# Patient Record
Sex: FEMALE
[De-identification: names, ages, dates, MRNs, and addresses within clinical notes are randomized per-mention and may not be internally consistent; named-entity substitution may affect disease eponyms.]

---

## 2021-12-28 ENCOUNTER — HOSPITAL ENCOUNTER (EMERGENCY)
Dept: HOSPITAL 5 - ED | Age: 31
Discharge: HOME | End: 2021-12-28
Payer: SELF-PAY

## 2021-12-28 VITALS — SYSTOLIC BLOOD PRESSURE: 140 MMHG | DIASTOLIC BLOOD PRESSURE: 99 MMHG

## 2021-12-28 DIAGNOSIS — R03.0: ICD-10-CM

## 2021-12-28 DIAGNOSIS — J02.8: Primary | ICD-10-CM

## 2021-12-28 PROCEDURE — 99283 EMERGENCY DEPT VISIT LOW MDM: CPT

## 2021-12-28 PROCEDURE — 87430 STREP A AG IA: CPT

## 2021-12-28 PROCEDURE — 87116 MYCOBACTERIA CULTURE: CPT

## 2021-12-28 NOTE — EMERGENCY DEPARTMENT REPORT
ED General Adult HPI





- General


Chief complaint: Sore Throat


Stated complaint: FLU TEST


Time Seen by Provider: 12/28/21 12:15


Source: patient


Mode of arrival: Ambulatory


Limitations: No Limitations





- History of Present Illness


Initial comments: 





1-year-old -American female patient presents with complaints of sore 

throat and body aches starting this morning. She reports her girlfriend is sick 

with similar symptoms. She denies any loss of taste or smell, cough, shortness 

of breath, rashes, or fever. Patient is not vaccinated against COVID-19. She 

denies any other past medical history. NKDA per patient


Severity scale (0 -10): 10





- Related Data


                                  Previous Rx's











 Medication  Instructions  Recorded  Last Taken  Type


 


Ibuprofen [Motrin 800 MG tab] 800 mg PO Q8HR PRN #20 tablet 12/28/21 Unknown Rx


 


Loratadine 10 mg PO QDAY #10 tablet 12/28/21 Unknown Rx











                                    Allergies











Allergy/AdvReac Type Severity Reaction Status Date / Time


 


nut - unspecified Allergy Severe Swelling Verified 12/28/21 10:06














ED Review of Systems


ROS: 


Stated complaint: FLU TEST


Other details as noted in HPI





Constitutional: malaise.  denies: chills, diaphoresis, fever, weakness


ENT: throat pain


Respiratory: denies: cough, shortness of breath


Cardiovascular: denies: chest pain


Neurological: denies: headache


Hematological/Lymphatic: denies: swollen glands





ED Past Medical Hx





- Medications


Home Medications: 


                                Home Medications











 Medication  Instructions  Recorded  Confirmed  Last Taken  Type


 


Ibuprofen [Motrin 800 MG tab] 800 mg PO Q8HR PRN #20 tablet 12/28/21  Unknown Rx


 


Loratadine 10 mg PO QDAY #10 tablet 12/28/21  Unknown Rx














ED Physical Exam





- General


Limitations: No Limitations


General appearance: alert, in no apparent distress





- Head


Head exam: Present: atraumatic, normocephalic





- Eye


Eye exam: Present: normal appearance.  Absent: scleral icterus





- Expanded ENT Exam


  ** Expanded


Mouth exam: Absent: drooling, trismus, muffled voice


Throat exam: Positive: tonsillar erythema (Mild bilateral).  Negative: 

tonsillomegaly, tonsillar exudate, R peritonsillar mass, L peritonsillar mass





- Neck


Neck exam: Present: normal inspection, full ROM.  Absent: tenderness, 

lymphadenopathy





- Respiratory


Respiratory exam: Present: normal lung sounds bilaterally.  Absent: respiratory 

distress





- Cardiovascular


Cardiovascular Exam: Present: regular rate





- Neurological Exam


Neurological exam: Present: alert, oriented X3





- Psychiatric


Psychiatric exam: Present: normal affect, normal mood





- Skin


Skin exam: Present: warm, dry, intact, normal color.  Absent: rash





ED Course


                                   Vital Signs











  12/28/21





  10:05


 


Temperature 97.9 F


 


Pulse Rate 65


 


Respiratory 16





Rate 


 


Blood Pressure 140/99


 


O2 Sat by Pulse 100





Oximetry 














ED Medical Decision Making





- Medical Decision Making








1-year-old -American female patient presents with complaints of sore 

throat and body aches starting this morning. She reports her girlfriend is sick 

with similar symptoms. She denies any loss of taste or smell, cough, shortness 

of breath, rashes, or fever. Patient is not vaccinated against COVID-19. She 

denies any other past medical history. NKDA per patient





Mild tonsillar erythema noted on exam without exudates or swelling. Rapid strep 

is negative. Will treat for viral pharyngitis conservatively. Recommend patient 

gets outpatient COVID-19 testing within the next 24 to 48 hours and self 

quarantine's until further instructed. She is otherwise well-appearing, her 

vitals are stable, she is stable for discharge home. Strict return precautions 

were discussed in detail with patient who verbalizes understanding. Patient also

recommended to PCP for recheck of her blood pressure


Critical care attestation.: 


If time is entered above; I have spent that time in minutes in the direct care 

of this critically ill patient, excluding procedure time.








ED Disposition


Clinical Impression: 


 Viral pharyngitis, Elevated blood pressure reading without diagnosis of 

hypertension





Disposition: 01 HOME / SELF CARE / HOMELESS


Is pt being admited?: No


Condition: Stable


Instructions:  Pharyngitis, Easy-to-Read, Hypertension, Adult


Additional Instructions: 


You test for strep throat is negative


Please ensure you get tested for Covid 19 within the next 24 to 48 hours and 

self quarantine until your results are back


The prescribed medications will help treat your symptoms of sore throat and body

aches


Please ensure you are drinking plenty of water and taking vitamin C to help 

boost your immune system


Your blood pressure was noted to be mildly elevated today, please follow-up with

your primary care doctor within 3 to 5 days for repeat blood pressure check


Prescriptions: 


Loratadine 10 mg PO QDAY #10 tablet


Ibuprofen [Motrin 800 MG tab] 800 mg PO Q8HR PRN #20 tablet


 PRN Reason: pain


Referrals: 


[facility] [Primary Care Provider] - 3-5 Days


[facility] [Provider Group] - 3-5 Days


Forms:  Work/School Release Form(ED)